# Patient Record
Sex: FEMALE | Race: WHITE | NOT HISPANIC OR LATINO | Employment: UNEMPLOYED | URBAN - METROPOLITAN AREA
[De-identification: names, ages, dates, MRNs, and addresses within clinical notes are randomized per-mention and may not be internally consistent; named-entity substitution may affect disease eponyms.]

---

## 2022-03-20 NOTE — PROGRESS NOTES
Cardiology Office Note  MD Chadwick Price MD Ronn Eng, DO, MD Ambrocio Mendoza DO, Rossy Ibrahim DO, [facility] [place]  ----------------------------------------------------------------  [address] [facility]  [address], [address] [facility]    Moses Almanzar 73 y o  female MRN: [id number]  Unit/Bed#:  Encounter: [id number]      History of Present Illness: It was a pleasure to see Moses Almanzar in the office today for initial CV evaluation  She has a past medical history of hypertension, dyslipidemia, obesity, PVCs and asthma  She established care with us in March 2022  She is here today due to her longstanding history of irregular heartbeats  She has been having irregular heartbeats for the past couple decades  He has irregular heartbeats would seem to come and go  They have become more frequent since January of 2022  Back in 2000, she had an episode of shortness of breath and elevated blood pressure that was associated with the palpitations/irregular heartbeats  At that time, her blood pressure was found to be markedly elevated  She previously had not been on antihypertensive medications  Due to her symptoms, she was sent for cardiac catheterization  Cardiac catheterization reportedly showed normal coronary arteries  For her elevated blood pressure, she was started on irbesartan  The palpitations remain stable but low level for many years in January 2022, she began to experience worsening palpitations that would occur main times a day  They would disrupt her day  She never had loss of consciousness and does not feel lightheaded with the episodes  Denies lower extremity swelling, orthopnea or paroxysmal nocturnal dyspnea  She is here today for evaluation of these palpitations    Of note, she has had progressive dyspnea on exertion and has difficulty walking short distances due to both pain in the back and legs as well as chronic dyspnea  Review of Systems:  Review of Systems   Constitutional: Negative for decreased appetite, fever, weight gain and weight loss  HENT: Negative for congestion and sore throat  Eyes: Negative for visual disturbance  Cardiovascular: Positive for dyspnea on exertion and palpitations  Negative for chest pain, leg swelling and near-syncope  Respiratory: Negative for cough and shortness of breath  Hematologic/Lymphatic: Negative for bleeding problem  Skin: Negative for rash  Musculoskeletal: Negative for myalgias and neck pain  Gastrointestinal: Negative for abdominal pain and nausea  Neurological: Negative for light-headedness and weakness  Psychiatric/Behavioral: Negative for depression  No past medical history on file  Past Surgical History:   Procedure Laterality Date   Reyna Ulrich CERVICAL  3/9/2015       Social History     Socioeconomic History    Marital status:      Spouse name: Not on file    Number of children: Not on file    Years of education: Not on file    Highest education level: Not on file   Occupational History    Not on file   Tobacco Use    Smoking status: Not on file    Smokeless tobacco: Not on file   Substance and Sexual Activity    Alcohol use: Not on file    Drug use: Not on file    Sexual activity: Not on file   Other Topics Concern    Not on file   Social History Narrative    Not on file     Social Determinants of Health     Financial Resource Strain: Not on file   Food Insecurity: Not on file   Transportation Needs: Not on file   Physical Activity: Not on file   Stress: Not on file   Social Connections: Not on file   Intimate Partner Violence: Not on file   Housing Stability: Not on file       No family history on file      Allergies   Allergen Reactions    Clonidine Shortness Of Breath and Wheezing    Amlodipine Besylate-Valsartan Other (See Comments)    Amoxicillin-Pot Clavulanate Other (See Comments)     SOB    Aspirin Other (See Comments)    Cefaclor Other (See Comments)     Was instructed not to take PCN, Cephalosporins, or Sulfas due to this serum sickness type reaction     Cephalexin Hives    Ibuprofen Other (See Comments)    Levaquin [Levofloxacin] Cough    Metoprolol Other (See Comments)     Asthma  Asthma      Naproxen Other (See Comments)    Other Other (See Comments)    Penicillins Other (See Comments)     Per MD, patient had serum sickness to a PCN many years ago    Sulfamethoxazole-Trimethoprim Other (See Comments)    Vancomycin Hives    Hydrochlorothiazide Rash         Current Outpatient Medications:     clobetasol (TEMOVATE) 0 05 % ointment, Apply 2 times a week for maintenance, Disp: , Rfl:     estradiol (ESTRACE) 0 1 mg/g vaginal cream, Insert 2 g into the vagina, Disp: , Rfl:     HYDROcodone-acetaminophen (NORCO) 5-325 mg per tablet, Take 1 tablet by mouth every 6 (six) hours as needed, Disp: , Rfl:     irbesartan (AVAPRO) 300 mg tablet, Take 300 mg by mouth, Disp: , Rfl:     MELATONIN PO, Take by mouth, Disp: , Rfl:     ondansetron (ZOFRAN-ODT) 4 mg disintegrating tablet, Take 4 mg by mouth every 8 (eight) hours as needed, Disp: , Rfl:     triamcinolone (KENALOG) 0 1 % cream, Apply topically, Disp: , Rfl:     ascorbic acid (VITAMIN C) 1000 MG tablet, Take 1,000 mg by mouth, Disp: , Rfl:     Docusate Sodium (DSS) 100 MG CAPS, Take 100 mg by mouth 2 (two) times a day as needed, Disp: , Rfl:     Flaxseed Oil OIL, Use daily, Disp: , Rfl:     GARLIC OIL PO, Take 1 tablet by mouth daily, Disp: , Rfl:     Ginkgo Biloba 40 MG TABS, Take 40 mg by mouth, Disp: , Rfl:     glucosamine-chondroitin 500-400 MG tablet, Take 1 tablet by mouth 2 (two) times a day, Disp: , Rfl:     multivitamin (THERAGRAN) TABS, Take 1 tablet by mouth every morning, Disp: , Rfl:     pyridoxine (VITAMIN B6) 50 mg tablet, Take 50 mg by mouth, Disp: , Rfl:     tamsulosin (FLOMAX) 0 4 mg, , Disp: , Rfl:     Vitals:    03/22/22 0914 BP: 112/60   BP Location: Left arm   Patient Position: Sitting   Cuff Size: Large   Pulse: (!) 53   Weight: 104 kg (229 lb 12 8 oz)     There is no height or weight on file to calculate BMI  PHYSICAL EXAMINATION:  Gen: Awake, Alert, NAD   Head/eyes: AT/NC, pupils equal and round, Anicteric  ENT: mmm  Neck: Supple, No elevated JVP, trachea midline  Resp: CTA bilaterally no w/r/r  CV: RRR +S1, S2, No m/r/g  Abd: Soft, obese, NT/ND + BS  Ext: no LE edema bilaterally  Neuro: Follows commands, moves all extermities  Psych: Appropriate affect, normal mood, pleasant attitude, non-combative  Skin: warm; no rash, erythema or venous stasis changes on exposed skin    --------------------------------------------------------------------------------  TREADMILL STRESS  No results found for this or any previous visit      --------------------------------------------------------------------------------  NUCLEAR STRESS TEST: No results found for this or any previous visit  No results found for this or any previous visit       --------------------------------------------------------------------------------  CATH:  No results found for this or any previous visit     --------------------------------------------------------------------------------  ECHO:   No results found for this or any previous visit  No results found for this or any previous visit     --------------------------------------------------------------------------------  HOLTER  No results found for this or any previous visit      No results found for this or any previous visit     --------------------------------------------------------------------------------  CAROTIDS  No results found for this or any previous visit      --------------------------------------------------------------------------------  ECGs:  Results for orders placed or performed in visit on 03/22/22   POCT ECG    Impression    Sinus bradycardia 53 bpm, otherwise normal ECG        No results found for: WBC, HGB, HCT, MCV, PLT   No results found for: SODIUM, K, CL, CO2, BUN, CREATININE, GLUC, CALCIUM   Lab Results   Component Value Date    HGBA1C 6 1 (H) 08/11/2021      No results found for: CHOL  No results found for: HDL  No results found for: LDLCALC  No results found for: TRIG  No results found for: CHOLHDL   No results found for: INR, PROTIME     1  Irregular heartbeat  -     POCT ECG  -     AMB extended holter monitor; Future; Expected date: 03/22/2022  -     Echo complete w/ contrast if indicated; Future; Expected date: 03/22/2022    2  PVC's (premature ventricular contractions)  -     AMB extended holter monitor; Future; Expected date: 03/22/2022  -     NM myocardial perfusion spect (rx stress and/or rest); Future; Expected date: 03/22/2022    3  Essential hypertension  -     Echo complete w/ contrast if indicated; Future; Expected date: 03/22/2022    4  Dyslipidemia    5  Obesity (BMI 30-39 9)    6  Shortness of breath on exertion  -     NM myocardial perfusion spect (rx stress and/or rest); Future; Expected date: 03/22/2022        IMPRESSION:   Dyspnea on exertion   Irregular heartbeat   Frequent PVCs during exercise   Hypertension   Dyslipidemia   Obesity   Exercise stress echocardiogram appears negative for myocardial ischemia, ET 2:44, MPHR 92%, 4 6 METs, October 2019   Asthma   Former tobacco use   Ambulatory dysfunction due to spinal surgery and joint pains    PLAN:  It was a pleasure to see Martin Mitchell in the office today for initial CV evaluation  She is here today due to her episodes of irregular heartbeat that have increased since January 2022  She has had progressive dyspnea on exertion since 2019  She has no symptoms concerning for heart failure and examines to be euvolemic  Blood pressure and heart rate are currently stable on her current antihypertensive regimen  ECG does not show acute ischemic changes, but shows minimal poor R-wave progression    She has been tolerating her medications without any reported adverse effects  Based on her clinical presentation, I have the following recommendations:    1  Recommend checking 1 week event recorder to assess for any evidence of significant arrhythmia  Am concerned that her symptoms may be related to symptomatic PVCs, but would check a monitor for completeness  2  Would obtain 2D echocardiogram to assess cardiac structure and function  3  Recommend pharmacologic nuclear stress test to assess for any evidence of obstructive coronary disease  Would perform this as a pharmacologic test due to her significant back discomfort and leg discomfort making it difficult for her at ambulate on a treadmill  4  Continue current antihypertensive regimen including irbesartan  5  Would encourage heart healthy diet low in sodium and carbohydrate  6  No changes to her medications at this time  7  Should her symptoms worsen in frequency or severity or change in quality, especially with loss of consciousness I would recommend seeking immediate medical attention  8  Will follow up with her after testing to review the results    As always, please do not hesitate to call with any questions  Portions of the record may have been created with voice recognition software  Occasional wrong word or "sound a like" substitutions may have occurred due to the inherent limitations of voice recognition software  Read the chart carefully and recognize, using context, where substitutions have occurred        Signed: Stephanie Romero DO, Drew Balo

## 2022-03-22 ENCOUNTER — OFFICE VISIT (OUTPATIENT)
Dept: CARDIOLOGY CLINIC | Facility: CLINIC | Age: 67
End: 2022-03-22
Payer: MEDICARE

## 2022-03-22 VITALS — HEART RATE: 53 BPM | SYSTOLIC BLOOD PRESSURE: 112 MMHG | DIASTOLIC BLOOD PRESSURE: 60 MMHG | WEIGHT: 229.8 LBS

## 2022-03-22 DIAGNOSIS — E78.5 DYSLIPIDEMIA: ICD-10-CM

## 2022-03-22 DIAGNOSIS — E66.9 OBESITY (BMI 30-39.9): ICD-10-CM

## 2022-03-22 DIAGNOSIS — I10 ESSENTIAL HYPERTENSION: ICD-10-CM

## 2022-03-22 DIAGNOSIS — R06.02 SHORTNESS OF BREATH ON EXERTION: ICD-10-CM

## 2022-03-22 DIAGNOSIS — I49.9 IRREGULAR HEARTBEAT: Primary | ICD-10-CM

## 2022-03-22 DIAGNOSIS — I49.3 PVC'S (PREMATURE VENTRICULAR CONTRACTIONS): ICD-10-CM

## 2022-03-22 PROCEDURE — 99204 OFFICE O/P NEW MOD 45 MIN: CPT | Performed by: INTERNAL MEDICINE

## 2022-03-22 PROCEDURE — 93000 ELECTROCARDIOGRAM COMPLETE: CPT | Performed by: INTERNAL MEDICINE

## 2022-03-22 RX ORDER — ONDANSETRON 4 MG/1
4 TABLET, ORALLY DISINTEGRATING ORAL EVERY 8 HOURS PRN
COMMUNITY
Start: 2022-03-08 | End: 2022-03-23

## 2022-03-22 RX ORDER — CLOBETASOL PROPIONATE 0.5 MG/G
OINTMENT TOPICAL
COMMUNITY
Start: 2021-10-07

## 2022-03-22 RX ORDER — ESTRADIOL 0.1 MG/G
2 CREAM VAGINAL
COMMUNITY
Start: 2021-10-07 | End: 2022-10-07

## 2022-03-22 RX ORDER — PYRIDOXINE HCL (VITAMIN B6) 50 MG
50 TABLET ORAL
COMMUNITY

## 2022-03-22 RX ORDER — IRBESARTAN 300 MG/1
300 TABLET ORAL
COMMUNITY
Start: 2021-09-23 | End: 2022-06-01 | Stop reason: SDUPTHER

## 2022-03-22 RX ORDER — LANOLIN ALCOHOL/MO/W.PET/CERES
1 CREAM (GRAM) TOPICAL 2 TIMES DAILY
COMMUNITY

## 2022-03-22 RX ORDER — DIPHENOXYLATE HYDROCHLORIDE AND ATROPINE SULFATE 2.5; .025 MG/1; MG/1
1 TABLET ORAL EVERY MORNING
COMMUNITY

## 2022-03-22 RX ORDER — TRIAMCINOLONE ACETONIDE 1 MG/G
CREAM TOPICAL
COMMUNITY
Start: 2021-08-11 | End: 2022-08-11

## 2022-03-22 RX ORDER — PSEUDOEPHEDRINE HCL 30 MG
100 TABLET ORAL 2 TIMES DAILY PRN
COMMUNITY

## 2022-03-22 RX ORDER — HYDROCODONE BITARTRATE AND ACETAMINOPHEN 5; 325 MG/1; MG/1
1 TABLET ORAL EVERY 6 HOURS PRN
COMMUNITY
Start: 2022-03-11

## 2022-03-22 RX ORDER — ASCORBIC ACID 1000 MG
40 TABLET ORAL
COMMUNITY

## 2022-03-22 RX ORDER — TAMSULOSIN HYDROCHLORIDE 0.4 MG/1
CAPSULE ORAL
COMMUNITY
Start: 2022-03-09

## 2022-04-08 ENCOUNTER — CLINICAL SUPPORT (OUTPATIENT)
Dept: CARDIOLOGY CLINIC | Facility: CLINIC | Age: 67
End: 2022-04-08
Payer: MEDICARE

## 2022-04-08 DIAGNOSIS — I49.3 PVC'S (PREMATURE VENTRICULAR CONTRACTIONS): ICD-10-CM

## 2022-04-08 DIAGNOSIS — I49.9 IRREGULAR HEARTBEAT: ICD-10-CM

## 2022-04-08 PROCEDURE — 93248 EXT ECG>7D<15D REV&INTERPJ: CPT | Performed by: INTERNAL MEDICINE

## 2022-05-26 ENCOUNTER — HOSPITAL ENCOUNTER (OUTPATIENT)
Dept: NUCLEAR MEDICINE | Facility: HOSPITAL | Age: 67
Discharge: HOME | End: 2022-05-26
Attending: INTERNAL MEDICINE
Payer: MEDICARE

## 2022-05-26 ENCOUNTER — HOSPITAL ENCOUNTER (OUTPATIENT)
Dept: NON INVASIVE DIAGNOSTICS | Facility: HOSPITAL | Age: 67
Discharge: HOME | End: 2022-05-26
Attending: INTERNAL MEDICINE
Payer: MEDICARE

## 2022-05-26 VITALS — WEIGHT: 229 LBS | BODY MASS INDEX: 38.15 KG/M2 | HEIGHT: 65 IN

## 2022-05-26 VITALS
DIASTOLIC BLOOD PRESSURE: 78 MMHG | HEIGHT: 65 IN | BODY MASS INDEX: 38.15 KG/M2 | WEIGHT: 229 LBS | SYSTOLIC BLOOD PRESSURE: 152 MMHG | HEART RATE: 50 BPM

## 2022-05-26 DIAGNOSIS — I49.9 IRREGULAR HEARTBEAT: ICD-10-CM

## 2022-05-26 DIAGNOSIS — I10 ESSENTIAL HYPERTENSION: ICD-10-CM

## 2022-05-26 DIAGNOSIS — R06.02 SHORTNESS OF BREATH ON EXERTION: ICD-10-CM

## 2022-05-26 DIAGNOSIS — I49.3 PVC'S (PREMATURE VENTRICULAR CONTRACTIONS): ICD-10-CM

## 2022-05-26 LAB
AORTIC ROOT: 3.7 CM
AORTIC VALVE MEAN VELOCITY: 8.3 M/S
APICAL FOUR CHAMBER EJECTION FRACTION: 72 %
ASCENDING AORTA: 3.7 CM
AV LVOT MEAN GRADIENT: 2 MMHG
AV LVOT PEAK GRADIENT: 4 MMHG
AV MEAN GRADIENT: 3 MMHG
AV PEAK GRADIENT: 8 MMHG
AV VELOCITY RATIO: 0.69
DOP CALC AO PEAK VEL: 1.42 M/S
DOP CALC AO VTI: 36.64 CM
DOP CALC LVOT PEAK VEL VTI: 26.27 CM
DOP CALC LVOT PEAK VEL: 0.98 M/S
E WAVE DECELERATION TIME: 274 MS
FRACTIONAL SHORTENING: 34 % (ref 28–44)
INTERVENTRICULAR SEPTUM IN DIASTOLE (PARASTERNAL SHORT AXIS VIEW): 1.3 CM
INTERVENTRICULAR SEPTUM: 1.3 CM (ref 0.6–1.1)
IVC: 2.4 MM
LAAS-AP4: 24.5 CM2
LEFT ATRIUM SIZE: 4 CM
LEFT INTERNAL DIMENSION IN SYSTOLE: 2.9 CM (ref 2.1–4)
LEFT VENTRICULAR INTERNAL DIMENSION IN DIASTOLE: 4.4 CM (ref 3.5–6)
LEFT VENTRICULAR POSTERIOR WALL IN END DIASTOLE: 1.1 CM
LEFT VENTRICULAR STROKE VOLUME: 55 ML
LVSV (TEICH): 55 ML
MAX HR: 67 BPM
MV E'TISSUE VEL-LAT: 12 CM/S
MV E'TISSUE VEL-SEP: 13 CM/S
MV PEAK A VEL: 0.65 M/S
MV PEAK E VEL: 102 CM/S
MV STENOSIS PRESSURE HALF TIME: 80 MS
MV VALVE AREA P 1/2 METHOD: 2.75 CM2
NUC STRESS EJECTION FRACTION: 67 %
RA PRESSURE ESTIMATED: 8 MMHG
RATE PRESSURE PRODUCT: NORMAL
RIGHT ATRIAL 2D VOLUME: 41 ML
RIGHT ATRIUM AREA SYSTOLE A4C: 16.8 CM2
RIGHT VENTRICLE ID DIMENSION: 3 CM
RV PSP: 36 MMHG
SL CV PED ECHO LEFT VENTRICLE DIASTOLIC VOLUME (MOD BIPLANE) 2D: 86 ML
SL CV PED ECHO LEFT VENTRICLE SYSTOLIC VOLUME (MOD BIPLANE) 2D: 31 ML
SL CV REST NUCLEAR ISOTOPE DOSE: 10.6 MCI
SL CV STRESS NUCLEAR ISOTOPE DOSE: 32.3 MCI
SL CV STRESS RECOVERY BP: NORMAL MMHG
SL CV STRESS RECOVERY HR: 63 BPM
SL CV STRESS RECOVERY O2 SAT: 98 %
STRESS ANGINA INDEX: 0
STRESS BASELINE BP: NORMAL MMHG
STRESS BASELINE HR: 52 BPM
STRESS O2 SAT REST: 99 %
STRESS PEAK HR: 104 BPM
STRESS POST ESTIMATED WORKLOAD: 1.5 METS
STRESS POST EXERCISE DUR MIN: 3 MIN
STRESS POST EXERCISE DUR SEC: 0 SEC
STRESS POST O2 SAT PEAK: 92 %
STRESS POST PEAK BP: 154 MMHG
STRESS/REST PERFUSION RATIO: 1.07
TR MAX PG: 28 MMHG
TR PEAK VELOCITY: 2.6 M/S
TRICUSPID VALVE PEAK REGURGITATION VELOCITY: 2.62 M/S

## 2022-05-26 PROCEDURE — 93018 CV STRESS TEST I&R ONLY: CPT | Performed by: INTERNAL MEDICINE

## 2022-05-26 PROCEDURE — 93306 TTE W/DOPPLER COMPLETE: CPT

## 2022-05-26 PROCEDURE — 93306 TTE W/DOPPLER COMPLETE: CPT | Performed by: INTERNAL MEDICINE

## 2022-05-26 PROCEDURE — 78452 HT MUSCLE IMAGE SPECT MULT: CPT | Performed by: INTERNAL MEDICINE

## 2022-05-26 PROCEDURE — A9502 TC99M TETROFOSMIN: HCPCS

## 2022-05-26 PROCEDURE — 93016 CV STRESS TEST SUPVJ ONLY: CPT | Performed by: INTERNAL MEDICINE

## 2022-05-26 PROCEDURE — 93017 CV STRESS TEST TRACING ONLY: CPT

## 2022-05-26 PROCEDURE — 78452 HT MUSCLE IMAGE SPECT MULT: CPT

## 2022-05-26 RX ADMIN — REGADENOSON 0.4 MG: 0.08 INJECTION, SOLUTION INTRAVENOUS at 10:10

## 2022-05-27 LAB
CHEST PAIN STATEMENT: NORMAL
MAX DIASTOLIC BP: 70 MMHG
MAX HEART RATE: 104 BPM
MAX PREDICTED HEART RATE: 155 BPM
MAX. SYSTOLIC BP: 154 MMHG
PROTOCOL NAME: NORMAL
REASON FOR TERMINATION: NORMAL
TARGET HR FORMULA: NORMAL
TIME IN EXERCISE PHASE: NORMAL

## 2022-06-01 ENCOUNTER — OFFICE VISIT (OUTPATIENT)
Dept: CARDIOLOGY CLINIC | Facility: CLINIC | Age: 67
End: 2022-06-01
Payer: MEDICARE

## 2022-06-01 VITALS
DIASTOLIC BLOOD PRESSURE: 70 MMHG | HEART RATE: 63 BPM | SYSTOLIC BLOOD PRESSURE: 122 MMHG | WEIGHT: 222.6 LBS | BODY MASS INDEX: 37.04 KG/M2

## 2022-06-01 DIAGNOSIS — I49.9 IRREGULAR HEARTBEAT: ICD-10-CM

## 2022-06-01 DIAGNOSIS — E66.01 SEVERE OBESITY (BMI 35.0-39.9) WITH COMORBIDITY (HCC): ICD-10-CM

## 2022-06-01 DIAGNOSIS — I10 ESSENTIAL HYPERTENSION: ICD-10-CM

## 2022-06-01 DIAGNOSIS — I49.3 PVC'S (PREMATURE VENTRICULAR CONTRACTIONS): ICD-10-CM

## 2022-06-01 DIAGNOSIS — R06.02 SHORTNESS OF BREATH ON EXERTION: Primary | ICD-10-CM

## 2022-06-01 DIAGNOSIS — E78.5 DYSLIPIDEMIA: ICD-10-CM

## 2022-06-01 PROCEDURE — 99214 OFFICE O/P EST MOD 30 MIN: CPT | Performed by: INTERNAL MEDICINE

## 2022-06-01 RX ORDER — IRBESARTAN 300 MG/1
300 TABLET ORAL DAILY
Qty: 90 TABLET | Refills: 1 | Status: SHIPPED | OUTPATIENT
Start: 2022-06-01 | End: 2022-08-30

## 2022-06-01 NOTE — PROGRESS NOTES
Cardiology Office Note  Minerva Bosworth, MD Gerilyn Bud, MD Matilda Hidden, DO, MD America Rodriguez DO, Devante Perez DO, [facility] - [place]  ----------------------------------------------------------------  [address], [address] [facility]    Guerrero Goldsmith 73 y o  female MRN: [id number]  Unit/Bed#:  Encounter: [id number]      History of Present Illness: It was a pleasure to see Guerrero Goldsmith in the office today for follow up CV evaluation  She has a past medical history of hypertension, dyslipidemia, obesity, PVCs and asthma  She established care with us in March 2022  She is here today due to her longstanding history of irregular heartbeats  She has been having irregular heartbeats for the past couple decades  He has irregular heartbeats would seem to come and go  They have become more frequent since January of 2022  Back in 2000, she had an episode of shortness of breath and elevated blood pressure that was associated with the palpitations/irregular heartbeats  At that time, her blood pressure was found to be markedly elevated  She previously had not been on antihypertensive medications  Due to her symptoms, she was sent for cardiac catheterization  Cardiac catheterization reportedly showed normal coronary arteries  For her elevated blood pressure, she was started on irbesartan  The palpitations remain stable but low level for many years in January 2022, she began to experience worsening palpitations that would occur main times a day  They would disrupt her day  She never had loss of consciousness and does not feel lightheaded with the episodes  Due to her symptoms, she was sent for event recorder, echocardiogram and stress test   She is here today to discuss the results  Since our last encounter, she continues to feel fatigued as she has not been getting much sleep in the past 2 years    She denies any recent chest pain, pressure, tightness or squeezing  She still has some shortness of breath with dyspnea on exertion  She also continues to experience the palpitations she has intermittently  Review of Systems:  Review of Systems   Constitutional: Negative for decreased appetite, fever, weight gain and weight loss  HENT: Negative for congestion and sore throat  Eyes: Negative for visual disturbance  Cardiovascular: Positive for dyspnea on exertion and palpitations  Negative for chest pain, leg swelling and near-syncope  Respiratory: Negative for cough and shortness of breath  Hematologic/Lymphatic: Negative for bleeding problem  Skin: Negative for rash  Musculoskeletal: Negative for myalgias and neck pain  Gastrointestinal: Negative for abdominal pain and nausea  Neurological: Negative for light-headedness and weakness  Psychiatric/Behavioral: Negative for depression  No past medical history on file  Past Surgical History:   Procedure Laterality Date   Shanika How CERVICAL  3/9/2015       Social History     Socioeconomic History    Marital status:      Spouse name: Not on file    Number of children: Not on file    Years of education: Not on file    Highest education level: Not on file   Occupational History    Not on file   Tobacco Use    Smoking status: Not on file    Smokeless tobacco: Not on file   Substance and Sexual Activity    Alcohol use: Not on file    Drug use: Not on file    Sexual activity: Not on file   Other Topics Concern    Not on file   Social History Narrative    Not on file     Social Determinants of Health     Financial Resource Strain: Not on file   Food Insecurity: Not on file   Transportation Needs: Not on file   Physical Activity: Not on file   Stress: Not on file   Social Connections: Not on file   Intimate Partner Violence: Not on file   Housing Stability: Not on file       No family history on file      Allergies   Allergen Reactions    Clonidine Shortness Of Breath and Wheezing    Amlodipine Besylate-Valsartan Other (See Comments)    Amoxicillin-Pot Clavulanate Other (See Comments)     SOB    Aspirin Other (See Comments)    Cefaclor Other (See Comments)     Was instructed not to take PCN, Cephalosporins, or Sulfas due to this serum sickness type reaction     Cephalexin Hives    Ibuprofen Other (See Comments)    Levaquin [Levofloxacin] Cough    Metoprolol Other (See Comments)     Asthma  Asthma      Naproxen Other (See Comments)    Other Other (See Comments)    Penicillins Other (See Comments)     Per MD, patient had serum sickness to a PCN many years ago    Sulfamethoxazole-Trimethoprim Other (See Comments)    Vancomycin Hives    Hydrochlorothiazide Rash         Current Outpatient Medications:     ascorbic acid (VITAMIN C) 1000 MG tablet, Take 1,000 mg by mouth, Disp: , Rfl:     clobetasol (TEMOVATE) 0 05 % ointment, Apply 2 times a week for maintenance, Disp: , Rfl:     Docusate Sodium (DSS) 100 MG CAPS, Take 100 mg by mouth 2 (two) times a day as needed, Disp: , Rfl:     estradiol (ESTRACE) 0 1 mg/g vaginal cream, Insert 2 g into the vagina, Disp: , Rfl:     Flaxseed Oil OIL, Use daily, Disp: , Rfl:     GARLIC OIL PO, Take 1 tablet by mouth daily, Disp: , Rfl:     Ginkgo Biloba 40 MG TABS, Take 40 mg by mouth, Disp: , Rfl:     glucosamine-chondroitin 500-400 MG tablet, Take 1 tablet by mouth 2 (two) times a day, Disp: , Rfl:     irbesartan (AVAPRO) 300 mg tablet, Take 1 tablet (300 mg total) by mouth daily, Disp: 90 tablet, Rfl: 1    MELATONIN PO, Take by mouth, Disp: , Rfl:     multivitamin (THERAGRAN) TABS, Take 1 tablet by mouth every morning, Disp: , Rfl:     pyridoxine (VITAMIN B6) 50 mg tablet, Take 50 mg by mouth, Disp: , Rfl:     triamcinolone (KENALOG) 0 1 % cream, Apply topically, Disp: , Rfl:     HYDROcodone-acetaminophen (NORCO) 5-325 mg per tablet, Take 1 tablet by mouth every 6 (six) hours as needed (Patient not taking: Reported on 6/1/2022), Disp: , Rfl:     ondansetron (ZOFRAN-ODT) 4 mg disintegrating tablet, Take 4 mg by mouth every 8 (eight) hours as needed, Disp: , Rfl:     tamsulosin (FLOMAX) 0 4 mg, , Disp: , Rfl:     Vitals:    06/01/22 1406   BP: 122/70   BP Location: Right arm   Patient Position: Sitting   Cuff Size: Large   Pulse: 63   Weight: 101 kg (222 lb 9 6 oz)     Body mass index is 37 04 kg/m²  PHYSICAL EXAMINATION:  Gen: Awake, Alert, NAD   Head/eyes: AT/NC, pupils equal and round, Anicteric  ENT: mmm  Neck: Supple, No elevated JVP, trachea midline  Resp: CTA bilaterally no w/r/r  CV: RRR +S1, S2, No m/r/g  Abd: Soft, obese, NT/ND + BS  Ext: no LE edema bilaterally  Neuro: Follows commands, moves all extermities  Psych: Appropriate affect, pleasant mood, pleasant attitude, non-combative  Skin: warm; no rash, erythema or venous stasis changes on exposed skin    --------------------------------------------------------------------------------  TREADMILL STRESS  Exercise stress echocardiogram appears negative for myocardial ischemia, ET 2:44, MPHR 92%, 4 6 METs, October 2019     --------------------------------------------------------------------------------  NUCLEAR STRESS TEST: No results found for this or any previous visit  No results found for this or any previous visit       --------------------------------------------------------------------------------  CATH:  No results found for this or any previous visit     --------------------------------------------------------------------------------  ECHO:   No results found for this or any previous visit  No results found for this or any previous visit     --------------------------------------------------------------------------------  HOLTER  No results found for this or any previous visit      No results found for this or any previous visit     --------------------------------------------------------------------------------  CAROTIDS  No results found for this or any previous visit      --------------------------------------------------------------------------------  ECGs:  No results found for this visit on 06/01/22  No results found for: WBC, HGB, HCT, MCV, PLT   No results found for: SODIUM, K, CL, CO2, BUN, CREATININE, GLUC, CALCIUM   Lab Results   Component Value Date    HGBA1C 6 1 (H) 08/11/2021      No results found for: CHOL  No results found for: HDL  No results found for: LDLCALC  No results found for: TRIG  No results found for: CHOLHDL   No results found for: INR, PROTIME     1  Shortness of breath on exertion    2  Essential hypertension  -     irbesartan (AVAPRO) 300 mg tablet; Take 1 tablet (300 mg total) by mouth daily    3  Dyslipidemia    4  Irregular heartbeat    5  PVC's (premature ventricular contractions)    6  Severe obesity (BMI 35 0-39  9) with comorbidity ([address] )        IMPRESSION:   Dyspnea on exertion  o Pharmacologic nuclear stress test negative for myocardial ischemia, breast attenuation, gated EF 67%, May 2022   Irregular heartbeat   Frequent PVCs during exercise  o Event recorder w/ SR avg HR 59 bpm, rare APCs, rare atrial couplets/triplets, rare VPCs, rare ventricular triplets, nonsustained PSVT (x63) longest 12 beats at 123 bpm, triggered events correlated with SR and isolated VPCs and occasionally with short runs nonsustained PSVT, April 2022   Hypertension   Dyslipidemia   Severe obesity   LVEF 65%, mild LVH, mild LA dilatation, AV sclerosis, mild MAC, trace MR, mild TR w/ PASP 36 mmHg, May 2022   Asthma   Former tobacco use   Ambulatory dysfunction due to spinal surgery and joint pains    PLAN:  It was a pleasure to see Lindy Faustin in the office today for follow-up CV evaluation  She is here today for follow-up regarding her stress test, echocardiogram and event recorder  Stress test was found to be negative for myocardial ischemia with evidence of breast attenuation    The echocardiogram demonstrated normal left ventricular function with only mild valvular regurgitation  Her pulmonary pressures were minimally elevated likely related to history of asthma, tobacco use and possibly undiagnosed MICA  Event recorder showed sinus rhythm and correlated triggered events with isolated VPCs and occasionally with short runs of nonsustained PSVT  She has had adverse effects in the past to metoprolol  She has no signs or symptoms of heart failure and examines to be euvolemic  Blood pressure and heart rate are currently stable  Based on her clinical presentation, I have the following recommendations:    1  Recommend aggressive risk factor and lifestyle modifications  2  Would encourage initiation of beta-blocker to help suppress her PVCs and occasional PSVT  She did have asthma like symptoms when she took metoprolol  May have to hold off on the initiation of beta-blocker, but if she would try an alternate, would try bisoprolol or carvedilol  May consider allergy evaluation prior to re-initiation of any beta-blocker  3  Recommend heart healthy diet low in sodium and carbohydrate  4  Would consider a sleep evaluation for potential MICA and her difficulty with rest  5  Would encourage 30 minutes a day, 5 days a week of moderate intensity activity to build cardiovascular endurance  6  Continue irbesartan for blood pressure control  7  As she is moving down to [place], we will follow up with her in [place] as needed  As always, please do not hesitate to call with any questions  Portions of the record may have been created with voice recognition software  Occasional wrong word or "sound a like" substitutions may have occurred due to the inherent limitations of voice recognition software  Read the chart carefully and recognize, using context, where substitutions have occurred        Signed: Chela Adams DO, Dolly Shilling

## 2022-10-05 ENCOUNTER — DOCUMENTATION (OUTPATIENT)
Dept: CARDIOLOGY CLINIC | Facility: CLINIC | Age: 67
End: 2022-10-05